# Patient Record
Sex: MALE | Race: WHITE | Employment: UNEMPLOYED | URBAN - NONMETROPOLITAN AREA
[De-identification: names, ages, dates, MRNs, and addresses within clinical notes are randomized per-mention and may not be internally consistent; named-entity substitution may affect disease eponyms.]

---

## 2017-04-27 ENCOUNTER — HOSPITAL ENCOUNTER (OUTPATIENT)
Age: 52
Discharge: HOME | End: 2017-04-27
Payer: COMMERCIAL

## 2017-04-27 ENCOUNTER — HOSPITAL ENCOUNTER (OUTPATIENT)
Dept: GENERAL RADIOLOGY | Age: 52
Discharge: HOME | End: 2017-04-27
Payer: COMMERCIAL

## 2017-04-27 DIAGNOSIS — R53.83 FATIGUE, UNSPECIFIED TYPE: ICD-10-CM

## 2017-04-27 LAB
EKG ATRIAL RATE: 65 BPM
EKG P AXIS: 48 DEGREES
EKG P-R INTERVAL: 146 MS
EKG Q-T INTERVAL: 400 MS
EKG QRS DURATION: 94 MS
EKG QTC CALCULATION (BAZETT): 416 MS
EKG R AXIS: -3 DEGREES
EKG T AXIS: 57 DEGREES
EKG VENTRICULAR RATE: 65 BPM

## 2017-04-27 PROCEDURE — 93005 ELECTROCARDIOGRAM TRACING: CPT

## 2017-04-27 PROCEDURE — 71020 XR CHEST STANDARD TWO VW: CPT

## 2019-06-28 ENCOUNTER — APPOINTMENT (OUTPATIENT)
Dept: CT IMAGING | Age: 54
End: 2019-06-28
Payer: COMMERCIAL

## 2019-06-28 ENCOUNTER — HOSPITAL ENCOUNTER (EMERGENCY)
Age: 54
Discharge: HOME | End: 2019-06-28
Attending: EMERGENCY MEDICINE
Payer: COMMERCIAL

## 2019-06-28 VITALS
OXYGEN SATURATION: 99 % | DIASTOLIC BLOOD PRESSURE: 55 MMHG | WEIGHT: 150 LBS | TEMPERATURE: 97.4 F | SYSTOLIC BLOOD PRESSURE: 129 MMHG | HEART RATE: 74 BPM | RESPIRATION RATE: 16 BRPM | BODY MASS INDEX: 20.92 KG/M2

## 2019-06-28 DIAGNOSIS — R10.9 ABDOMINAL PAIN, UNSPECIFIED ABDOMINAL LOCATION: Primary | ICD-10-CM

## 2019-06-28 LAB
ABSOLUTE EOS #: 0.8 K/UL (ref 0–0.44)
ABSOLUTE IMMATURE GRANULOCYTE: <0.03 K/UL (ref 0–0.3)
ABSOLUTE LYMPH #: 2.35 K/UL (ref 1.1–3.7)
ABSOLUTE MONO #: 0.74 K/UL (ref 0.1–1.2)
ALBUMIN SERPL-MCNC: 4.2 G/DL (ref 3.5–5.2)
ALBUMIN/GLOBULIN RATIO: 1.6 (ref 1–2.5)
ALP BLD-CCNC: 76 U/L (ref 40–129)
ALT SERPL-CCNC: 140 U/L (ref 5–41)
ANION GAP SERPL CALCULATED.3IONS-SCNC: 9 MMOL/L (ref 9–17)
AST SERPL-CCNC: 84 U/L
BASOPHILS # BLD: 1 % (ref 0–2)
BASOPHILS ABSOLUTE: 0.04 K/UL (ref 0–0.2)
BILIRUB SERPL-MCNC: 0.45 MG/DL (ref 0.3–1.2)
BILIRUBIN URINE: NEGATIVE
BUN BLDV-MCNC: 17 MG/DL (ref 6–20)
BUN/CREAT BLD: 22 (ref 9–20)
CALCIUM SERPL-MCNC: 9.4 MG/DL (ref 8.6–10.4)
CHLORIDE BLD-SCNC: 104 MMOL/L (ref 98–107)
CO2: 25 MMOL/L (ref 20–31)
COLOR: YELLOW
COMMENT UA: NORMAL
CREAT SERPL-MCNC: 0.79 MG/DL (ref 0.7–1.2)
DIFFERENTIAL TYPE: ABNORMAL
EOSINOPHILS RELATIVE PERCENT: 10 % (ref 1–4)
GFR AFRICAN AMERICAN: >60 ML/MIN
GFR NON-AFRICAN AMERICAN: >60 ML/MIN
GFR SERPL CREATININE-BSD FRML MDRD: ABNORMAL ML/MIN/{1.73_M2}
GFR SERPL CREATININE-BSD FRML MDRD: ABNORMAL ML/MIN/{1.73_M2}
GLUCOSE BLD-MCNC: 100 MG/DL (ref 70–99)
GLUCOSE URINE: NEGATIVE
HCT VFR BLD CALC: 41.6 % (ref 40.7–50.3)
HEMOGLOBIN: 13.7 G/DL (ref 13–17)
IMMATURE GRANULOCYTES: 0 %
KETONES, URINE: NEGATIVE
LEUKOCYTE ESTERASE, URINE: NEGATIVE
LIPASE: 24 U/L (ref 13–60)
LYMPHOCYTES # BLD: 31 % (ref 24–43)
MCH RBC QN AUTO: 31.8 PG (ref 25.2–33.5)
MCHC RBC AUTO-ENTMCNC: 32.9 G/DL (ref 28.4–34.8)
MCV RBC AUTO: 96.5 FL (ref 82.6–102.9)
MONOCYTES # BLD: 10 % (ref 3–12)
NITRITE, URINE: NEGATIVE
NRBC AUTOMATED: 0 PER 100 WBC
PDW BLD-RTO: 13 % (ref 11.8–14.4)
PH UA: 7 (ref 5–9)
PLATELET # BLD: ABNORMAL K/UL (ref 138–453)
PLATELET ESTIMATE: ABNORMAL
PLATELET, FLUORESCENCE: 135 K/UL (ref 138–453)
PLATELET, IMMATURE FRACTION: 3 % (ref 1.1–10.3)
PMV BLD AUTO: ABNORMAL FL (ref 8.1–13.5)
POTASSIUM SERPL-SCNC: 3.9 MMOL/L (ref 3.7–5.3)
PROTEIN UA: NEGATIVE
RBC # BLD: 4.31 M/UL (ref 4.21–5.77)
RBC # BLD: ABNORMAL 10*6/UL
SEG NEUTROPHILS: 49 % (ref 36–65)
SEGMENTED NEUTROPHILS ABSOLUTE COUNT: 3.75 K/UL (ref 1.5–8.1)
SODIUM BLD-SCNC: 138 MMOL/L (ref 135–144)
SPECIFIC GRAVITY UA: 1.01 (ref 1.01–1.02)
TOTAL PROTEIN: 6.9 G/DL (ref 6.4–8.3)
TSH SERPL DL<=0.05 MIU/L-ACNC: 0.87 MIU/L (ref 0.3–5)
TURBIDITY: CLEAR
URINE HGB: NEGATIVE
UROBILINOGEN, URINE: NORMAL
WBC # BLD: 7.7 K/UL (ref 3.5–11.3)
WBC # BLD: ABNORMAL 10*3/UL

## 2019-06-28 PROCEDURE — 80053 COMPREHEN METABOLIC PANEL: CPT

## 2019-06-28 PROCEDURE — 81003 URINALYSIS AUTO W/O SCOPE: CPT

## 2019-06-28 PROCEDURE — 84443 ASSAY THYROID STIM HORMONE: CPT

## 2019-06-28 PROCEDURE — 85025 COMPLETE CBC W/AUTO DIFF WBC: CPT

## 2019-06-28 PROCEDURE — 2580000003 HC RX 258: Performed by: EMERGENCY MEDICINE

## 2019-06-28 PROCEDURE — 74177 CT ABD & PELVIS W/CONTRAST: CPT

## 2019-06-28 PROCEDURE — 6360000004 HC RX CONTRAST MEDICATION: Performed by: EMERGENCY MEDICINE

## 2019-06-28 PROCEDURE — 36415 COLL VENOUS BLD VENIPUNCTURE: CPT

## 2019-06-28 PROCEDURE — 99284 EMERGENCY DEPT VISIT MOD MDM: CPT

## 2019-06-28 PROCEDURE — 83690 ASSAY OF LIPASE: CPT

## 2019-06-28 RX ORDER — 0.9 % SODIUM CHLORIDE 0.9 %
1000 INTRAVENOUS SOLUTION INTRAVENOUS ONCE
Status: COMPLETED | OUTPATIENT
Start: 2019-06-28 | End: 2019-06-28

## 2019-06-28 RX ADMIN — IOPAMIDOL 75 ML: 755 INJECTION, SOLUTION INTRAVENOUS at 14:56

## 2019-06-28 RX ADMIN — SODIUM CHLORIDE 1000 ML: 9 INJECTION, SOLUTION INTRAVENOUS at 14:46

## 2019-06-28 ASSESSMENT — PAIN DESCRIPTION - LOCATION: LOCATION: ABDOMEN

## 2019-06-28 ASSESSMENT — PAIN SCALES - GENERAL: PAINLEVEL_OUTOF10: 10

## 2019-06-28 ASSESSMENT — PAIN DESCRIPTION - PAIN TYPE: TYPE: ACUTE PAIN

## 2019-06-28 ASSESSMENT — PAIN DESCRIPTION - DESCRIPTORS: DESCRIPTORS: SHARP

## 2019-06-28 ASSESSMENT — PAIN DESCRIPTION - ORIENTATION: ORIENTATION: LEFT

## 2019-06-28 NOTE — ED PROVIDER NOTES
[facility] ED  [address] [facility]   Chief Complaint   Patient presents with    Abdominal Pain     LUQ into LLQ and left groin. Pt states onset x 2 weeks      HPI   Jerri Soto is a 47 y.o. male who presents with llq abdominal pain, onset was 2 weeks ago. The duration has been constant since the onset. The pain appeared to be moderate though he rates it  A 10  /10. The patient has associated generalized weakness and fatigue. There are no alleviating factors. The context is that the symptoms started spontaneously, without any known precipitants. They have not had this previously. REVIEW OF SYSTEMS   GI: Patient complains of abdominal pain  Cardiac: No chest pain or syncope  Pulmonary: No difficulty breathing or new cough  General: No fevers  : No hematuria or dysuria  See HPI for further details. All other review of systems otherwise negative. PAST MEDICAL & SURGICAL HISTORY   Past Medical History:   Diagnosis Date    Acid reflux 15 years ago    symptoms assoc. with gallbladder    Chronic back pain     Irregular heartbeat      Past Surgical History:   Procedure Laterality Date    COLONOSCOPY      Patient states no previous colonoscopy    COLONOSCOPY  12/28/15    -polyp    COLONOSCOPY  10/24/2016    ,hemorrhoids    NOSE SURGERY      closed reduction    UPPER GASTROINTESTINAL ENDOSCOPY  10/24/2016          CURRENT MEDICATIONS   Current Outpatient Rx   Medication Sig Dispense Refill    famotidine (PEPCID) 40 MG tablet Take 1 tablet by mouth daily 30 tablet 0    tiZANidine (ZANAFLEX) 2 MG capsule Take 2 mg by mouth 3 times daily 1/2 tab twice per day.  1 tab at bedtime        ALLERGIES   No Known Allergies   SOCIAL AND FAMILY HISTORY   Social History     Socioeconomic History    Marital status:      Spouse name: None    Number of children: None    Years of education: None    Highest education level: None Contrast? None   Preliminary Result   1. Mild wall thickening in the sigmoid colon, most likely due to incomplete   distention. Possibility of mild nonspecific colitis is not entirely excluded. 2. Otherwise no potentially acute findings in the abdomen or pelvis. 3. Incidental findings include an unchanged left hepatic cyst, tiny right   renal cyst, and punctate left renal calculus. ED COURSE & MEDICAL DECISION MAKING   Pertinent Labs & Imaging studies reviewed and interpreted. (See chart for details)   See EMR for medications prescribed  Vitals:    06/28/19 1410   BP: (!) 129/55   Pulse: 74   Resp: 16   Temp: 97.4 °F (36.3 °C)   SpO2: 99%     Differential diagnosis: Abdominal Aortic Aneurysm, Ischemic Bowel, Bowel Obstruction, Acute Cholecystitis, Acute Appendicitis, other     MDM: Patient recommended to follow-up with gastroenterology. Otherwise he is well enough to be discharged from the emergency department. FINAL IMPRESSION   1.  Abdominal pain, unspecified abdominal location        PLAN  Home with gi follow up  Electronically signed by: Zara Weathers MD, 6/28/2019 3:59 PM  (This note was completed with a voice recognition program)            Zara Weathers MD  06/28/19 3580

## 2021-06-05 PROBLEM — K74.60 CIRRHOSIS (HCC): Status: ACTIVE | Noted: 2021-06-05

## 2021-06-05 PROBLEM — I21.4 NON-STEMI (NON-ST ELEVATED MYOCARDIAL INFARCTION) (HCC): Status: ACTIVE | Noted: 2021-06-05

## 2021-06-05 PROBLEM — I95.9 HYPOTENSION: Status: ACTIVE | Noted: 2021-06-05

## 2023-02-23 ENCOUNTER — HOSPITAL ENCOUNTER (OUTPATIENT)
Dept: PHYSICAL THERAPY | Age: 58
Setting detail: THERAPIES SERIES
Discharge: HOME | End: 2023-02-23
Payer: COMMERCIAL

## 2023-02-23 PROCEDURE — 97140 MANUAL THERAPY 1/> REGIONS: CPT

## 2023-02-23 PROCEDURE — 97110 THERAPEUTIC EXERCISES: CPT

## 2023-02-23 PROCEDURE — 97161 PT EVAL LOW COMPLEX 20 MIN: CPT

## 2023-02-23 NOTE — PLAN OF CARE
[facility]           Phone: [phone]             Outpatient Physical Therapy  Fax: [phone]                                           Date: 2023  Patient: Jessica Schwartz : 1965 CSN #: [id number]   Referring Physician: SHAD Valencia*      [x] Plan of Care   [] Updated Plan of Care    Dates of Service to Include: 2023 to 23    Diagnosis:  Cervical DDD, M50.30; Cervical spondylosis, M47.812    Rehab (Treatment) Diagnosis:  Neck pain             Onset Date:  10/14/22    Attendance  Total # of Visits to Date: 1 No Show: 0 Canceled Appointment: 0    Assessment  Assessment: Patient is 62year old male with dx of cervical DDD who presents with increased pain 7/10 on average that is worse with certain movements. Pt is unable to drive d/t pain and limited ROM. B shld flex/abd: 120*, ER: T3, IR: T10; TTP C6-7 spinous processes, B cervical paraspinals and suboccipitals; mild fwd head and shoulders; (+) B spurling compression; relief with gentle manual cervical traction in supine. Decreased CROM flexion: 45*, ext: 10*, B SB: 15* and B rotation 20* with increased pain all movements. Decreased B shoulder strength: 4-/5 grossly all planes. Patient to benefit from physical therapy to decrease pain  and tone and improve ROM and strength to return to PLOF. Goals  Short Term Goals  Time Frame for Short Term Goals: 3 weeks  Short Term Goal 1: Patient to initiate HEP for improved CROM and scapular stability. Short Term Goal 2: Patient to be instructed in gentle CROM and postural strengthening to decrease neck pain. Short Term Goal 3: Initiate manual techniques/modalities prn to decrease pain and improve mobility. Long Term Goals  Time Frame for Long Term Goals : 6 weeks  Long Term Goal 1: Patient to be independent and compliant with HEP.   Long Term Goal 2: Patient to have improved CROM flexion: 50*, ext: 20*, B SB: 20* and B rotation: 45* with no increase in pain for improved mobility. Long Term Goal 3: Patient to have improved B shoulder strength >/=4/5 grossly all planes for improved postural control. Long Term Goal 4: Patient to report >/=75% improvement in symptoms for improved QOL.      Prognosis  Therapy Prognosis: Good    Treatment Plan   Plan Frequency: 2  Plan weeks: 6  [x] HP/CP      [x] Electrical Stim   [x] Therapeutic Exercise      [] Gait Training  [] Aquatics   [] Ultrasound         [x] Patient Education/HEP   [x] Manual Therapy  [x] Traction    [x] Neuro-kiki        [x] Soft Tissue Mobs            [] Home TENS  [] Iontophoresis    [] Orthotic casting/fitting      [x] Dry Needling  [] Blood Flow Restriction             Electronically signed by: Jesse Castro PT, DPT    Date: 2/23/2023      ______________________________________ Date: 2/23/2023   Physician Signature

## 2023-02-23 NOTE — PROGRESS NOTES
Phone: [address]          Fax: [phone]                      Outpatient Physical Therapy                                                                      Evaluation  Date: 2023  Patient: Caren Rene  : 1965  CSN #: [id number]    Referring Physician: SHAD Mckenzie*     Medical Diagnosis: Cervical DDD, M50.30; Cervical spondylosis, M47.812    Treatment Diagnosis: Neck pain  Onset Date: 10/14/22  PT Insurance Information: [employer]  Total # of Visits Approved: 12   Total # of Visits to Date: 1  No Show: 0  Canceled Appointment: 0     Subjective  Subjective: Patient reports neck injury back in  and again in October after hitting head on metal beam. Pain doctor did xrays and wants him to do therapy before trying anything else. 7/10 pain on average that is worse with certain movements. Trouble sleeping at night. The headaches and migraines have come back as well. Pain stays in the neck escept with extension when it goes all the way down the spine.   Additional Pertinent Hx: Vision problems    Observations:   General Observations  Description: B shld flex/abd: 120*, ER: T3, IR: T10; TTP C6-7 spinous processes, B cervical paraspinals and suboccipitals; mild fwd head and shoulders; (+) B spurling compression; relief with gentle manual cervical traction in supine      Objective    Strength       Strength LUE  L Shoulder Flexion: 4-/5  L Shoulder ABduction: 4-/5  L Shoulder Internal Rotation: 4-/5  L Shoulder External Rotation: 4-/5  L Elbow Flexion: 4-/5  L Elbow Extension: 4-/5     Cervical Assessment     AROM Cervical Spine   Cervical spine general AROM: B rotation: 20* with pain; flexion: 45* with pain; ext: 15* with pain; B SB: 15*       Special Tests:     Strength RUE  R Shoulder Flexion: 4-/5  R Shoulder ABduction: 4-/5  R Shoulder Internal Rotation: 4-/5  R Shoulder External Rotation: 4-/5  R Elbow Flexion: 4-/5  R Elbow Extension: 4-/5  Strength LUE  L Shoulder Flexion: 4-/5  L Shoulder ABduction: 4-/5  L Shoulder Internal Rotation: 4-/5  L Shoulder External Rotation: 4-/5  L Elbow Flexion: 4-/5  L Elbow Extension: 4-/5      Exercises:  Exercise 1: HEP: chin tucks, scap retracs, CROM rotation    Manual:  Manual Traction: Gentle manual cervical traction in supine to decrease pain  Soft Tissue Mobilizaton: DTM to B cervical paraspinals, suboccipitals, upper traps, lev scaps and SCM's to decrease tone and pain       Functional Outcome Measures  Section 1: Pain Intensity: The pain is very severe at the moment  Section 2: Personal Care (Washing, Dressing, etc.): It is painful to look after myself and I am slow and careful  Section 3: Lifting: Pain prevents me from lifting heavy weights, but I can manage light to medium weights if conveniently positioned  Section 4: Reading: I cannot read as much as I want because of moderate pain in my neck  Section 5: Headaches: I have moderate headaches, which come frequently  Section 6: Concentration: I have a fair degree of difficulty in concentrating when I want to  Section 7: Work: I cannot do my usual work  Section 8: Driving: I cannot drive my car at all  Section 9: Sleeping: My sleep is greatly disturbed (3-5 hours sleepless)  Section 10: Recreation: I can hardly do any recreation activities because of pain in my neck  Neck Disability Index Raw Score: 33      Assessment  Assessment: Patient is 62year old male with dx of cervical DDD who presents with increased pain 7/10 on average that is worse with certain movements. Pt is unable to drive d/t pain and limited ROM. B shld flex/abd: 120*, ER: T3, IR: T10; TTP C6-7 spinous processes, B cervical paraspinals and suboccipitals; mild fwd head and shoulders; (+) B spurling compression; relief with gentle manual cervical traction in supine. Decreased CROM flexion: 45*, ext: 10*, B SB: 15* and B rotation 20* with increased pain all movements.  Decreased B shoulder strength: 4-/5 grossly all planes. Patient to benefit from physical therapy to decrease pain  and tone and improve ROM and strength to return to PLOF. Therapy Prognosis: Good        Decision Making: Low Complexity    Patient Education  PT eval, POC, HEP   Pt verbalized/demonstrated good understanding:     [X] Yes         [] No, pt required further clarification. Goals  Short Term Goals  Time Frame for Short Term Goals: 3 weeks  Short Term Goal 1: Patient to initiate HEP for improved CROM and scapular stability. Short Term Goal 2: Patient to be instructed in gentle CROM and postural strengthening to decrease neck pain. Short Term Goal 3: Initiate manual techniques/modalities prn to decrease pain and improve mobility. Long Term Goals  Time Frame for Long Term Goals : 6 weeks  Long Term Goal 1: Patient to be independent and compliant with HEP. Long Term Goal 2: Patient to have improved CROM flexion: 50*, ext: 20*, B SB: 20* and B rotation: 45* with no increase in pain for improved mobility. Long Term Goal 3: Patient to have improved B shoulder strength >/=4/5 grossly all planes for improved postural control. Long Term Goal 4: Patient to report >/=75% improvement in symptoms for improved QOL.         Minutes Tracking:  Time In: 3333  Time Out: [address]  Minutes: 50  Timed Code Treatment Minutes: [address], PT, DPT    2/23/2023

## 2023-03-02 ENCOUNTER — HOSPITAL ENCOUNTER (OUTPATIENT)
Dept: PHYSICAL THERAPY | Age: 58
Setting detail: THERAPIES SERIES
Discharge: HOME | End: 2023-03-02
Payer: COMMERCIAL

## 2023-03-02 PROCEDURE — 97110 THERAPEUTIC EXERCISES: CPT

## 2023-03-02 PROCEDURE — 97140 MANUAL THERAPY 1/> REGIONS: CPT

## 2023-03-02 NOTE — PROGRESS NOTES
Phone: [place]           Fax: [phone]                           Outpatient Physical Therapy                                                                            Daily Note    Patient: Thais Rene : 1965  [facility] #: [id number]   Referring Physician: Amparo Carlin*    Date: 3/2/2023    Diagnosis: Cervical DDD, M50.30; Cervical spondylosis, M47.812  Treatment Diagnosis: Neck pain    Onset Date: 10/14/22  PT Insurance Information: [employer]  Total # of Visits Approved: 12 Per Physician Order  Total # of Visits to Date: 2  No Show: 0  Canceled Appointment: 0      Pre-Treatment Pain:  7/10  Subjective: Patient reports pain about the same as last time; 7/10 in the neck. A lot of soreness since manual techniques last time. Exercises:  Exercise 1: HEP: chin tucks, scap retracs, CROM rotation  Exercise 2: Shld shrugs/rolls/retracs 10x  Exercise 3: Cervical rotation L/R 10x ea way with self mob's with towel  Exercise 4: Seated chin tucks 10x    Manual:  Joint Mobilization: Gr I-II thoracic UPA mob's to improve mobility  Manual Traction: Gentle manual cervical traction in supine to decrease pain  Soft Tissue Mobilizaton: DTM to B cervical paraspinals, suboccipitals, upper traps, lev scaps and SCM's to decrease tone and pain  Other: IDN static placement of 4 1in needles to B cervical paraspinals and 2 0.5 in needles to suboccipitals to decrease pain and tone and promote healing    Modalities:   HP to neck and mid back x10 min to decrease soreness    Assessment  Assessment: Initiated gentle stretching and scapular strengthening in sitting to improve mobility and postural control. Patient with fair tolerance. Continued manual techniques to decrease neck pain and improve muscle tone and mobility. Trialed IDN to neck in prone to promote healing response. Patient with fair tolerance to all.  HP to neck and mid back in hooklying x10 min to decrease tone and soreness. Will continue. Activity Tolerance  Activity Tolerance: Patient tolerated treatment well, Patient limited by pain    Patient Education  Exercise technique; IDN rationale   Pt verbalized/demonstrated good understanding:     [x] Yes         [] No, pt required further clarification. Post Treatment Pain:  5/10      Plan  Plan Frequency: 2  Plan weeks: 6       Goals  (Total # of Visits to Date: 2)      Short Term Goals  Time Frame for Short Term Goals: 3 weeks  Short Term Goal 1: Patient to initiate HEP for improved CROM and scapular stability. -met  Short Term Goal 2: Patient to be instructed in gentle CROM and postural strengthening to decrease neck pain.-met  Short Term Goal 3: Initiate manual techniques/modalities prn to decrease pain and improve mobility. -met    Long Term Goals  Time Frame for Long Term Goals : 6 weeks  Long Term Goal 1: Patient to be independent and compliant with HEP. Long Term Goal 2: Patient to have improved CROM flexion: 50*, ext: 20*, B SB: 20* and B rotation: 45* with no increase in pain for improved mobility. Long Term Goal 3: Patient to have improved B shoulder strength >/=4/5 grossly all planes for improved postural control. Long Term Goal 4: Patient to report >/=75% improvement in symptoms for improved QOL.     Minutes Tracking:  Time In: [address] [place]  Time Out: 7514  Minutes: 59  Timed Code Treatment Minutes: Norma Lancastero Kushal Michaels PT, DPT     Date: 3/2/2023

## 2023-03-09 ENCOUNTER — HOSPITAL ENCOUNTER (OUTPATIENT)
Dept: PHYSICAL THERAPY | Age: 58
Setting detail: THERAPIES SERIES
Discharge: HOME | End: 2023-03-09
Payer: COMMERCIAL

## 2023-03-09 NOTE — PROGRESS NOTES
[facility]  Inpatient/Observation/Outpatient Rehabilitation    Date: 3/9/2023  Patient Name: Vadim Wheat       [] Inpatient Acute/Observation       [x]  Outpatient  : 1965       [x] Pt no showed for scheduled appointment    [] Pt refused/declined therapy at this time due to:           [] Pt cancelled due to:  [] No Reason Given   [] Sick/ill   [x] Other: Attempted to call patient but no answer; voicemail not set up. Therapist/Assistant will attempt to see this patient, at our earliest opportunity.        Rachel Adams, PT, DPT Date: 3/9/2023

## 2023-03-14 ENCOUNTER — APPOINTMENT (OUTPATIENT)
Dept: PHYSICAL THERAPY | Age: 58
End: 2023-03-14
Payer: COMMERCIAL

## 2023-03-16 ENCOUNTER — APPOINTMENT (OUTPATIENT)
Dept: PHYSICAL THERAPY | Age: 58
End: 2023-03-16
Payer: COMMERCIAL

## 2023-03-21 ENCOUNTER — APPOINTMENT (OUTPATIENT)
Dept: PHYSICAL THERAPY | Age: 58
End: 2023-03-21
Payer: COMMERCIAL

## 2023-03-23 ENCOUNTER — APPOINTMENT (OUTPATIENT)
Dept: PHYSICAL THERAPY | Age: 58
End: 2023-03-23
Payer: COMMERCIAL

## 2023-03-28 ENCOUNTER — APPOINTMENT (OUTPATIENT)
Dept: PHYSICAL THERAPY | Age: 58
End: 2023-03-28
Payer: COMMERCIAL

## 2023-03-30 ENCOUNTER — HOSPITAL ENCOUNTER (OUTPATIENT)
Dept: PHYSICAL THERAPY | Age: 58
Setting detail: THERAPIES SERIES
Discharge: HOME | End: 2023-03-30
Payer: COMMERCIAL

## 2023-03-30 NOTE — PROGRESS NOTES
[facility]  Inpatient/Observation/Outpatient Rehabilitation    Date: 3/30/2023  Patient Name: Christo Benavides       [] Inpatient Acute/Observation       [x]  Outpatient  : 1965       [x] Pt no showed for scheduled appointment    [] Pt refused/declined therapy at this time due to:           [] Pt cancelled due to:  [] No Reason Given   [] Sick/ill   [x] Other: Called patient but no answer, no voicemail box set up. Therapist/Assistant will attempt to see this patient, at our earliest opportunity.        Corinne Negrete, PT, DPT Date: 3/30/2023

## 2023-04-04 ENCOUNTER — HOSPITAL ENCOUNTER (OUTPATIENT)
Dept: PHYSICAL THERAPY | Age: 58
Setting detail: THERAPIES SERIES
Discharge: HOME | End: 2023-04-04

## 2023-04-04 NOTE — PROGRESS NOTES
[facility]  Inpatient/Observation/Outpatient Rehabilitation    Date: 2023  Patient Name: Naveen Fink       [] Inpatient Acute/Observation       [x]  Outpatient  : 1965       [x] Pt no showed for scheduled appointment    [] Pt refused/declined therapy at this time due to:           [] Pt cancelled due to:  [] No Reason Given   [] Sick/ill   [] Other:    Therapist/Assistant will attempt to see this patient, at our earliest opportunity.        Michael Jauregui Date: 2023